# Patient Record
Sex: FEMALE | Race: AMERICAN INDIAN OR ALASKA NATIVE
[De-identification: names, ages, dates, MRNs, and addresses within clinical notes are randomized per-mention and may not be internally consistent; named-entity substitution may affect disease eponyms.]

---

## 2022-05-09 ENCOUNTER — HOSPITAL ENCOUNTER (EMERGENCY)
Dept: HOSPITAL 5 - ED | Age: 37
Discharge: HOME | End: 2022-05-09
Payer: OTHER GOVERNMENT

## 2022-05-09 VITALS — SYSTOLIC BLOOD PRESSURE: 133 MMHG | DIASTOLIC BLOOD PRESSURE: 86 MMHG

## 2022-05-09 DIAGNOSIS — M79.671: Primary | ICD-10-CM

## 2022-05-09 PROCEDURE — 99283 EMERGENCY DEPT VISIT LOW MDM: CPT

## 2022-05-09 NOTE — XRAY REPORT
RIGHT FOOT 3 VIEWS



INDICATION / CLINICAL INFORMATION:

injury, pain



COMPARISON:

None available.

 

FINDINGS:



BONES / JOINT(S): No acute fracture or subluxation. No significant arthritis.

SOFT TISSUES: No significant abnormality.



ADDITIONAL FINDINGS: None.





IMPRESSION:

No acute findings.



Signer Name: Juliocesar Silverman MD 

Signed: 5/9/2022 1:04 PM

Workstation Name: [id number]

## 2022-05-09 NOTE — EMERGENCY DEPARTMENT REPORT
ED Lower Extremity HPI





- General


Chief Complaint: Extremity Injury, Lower


Stated Complaint: RT FOOT INJURY


Time Seen by Provider: 05/09/22 12:39


Source: patient


Mode of arrival: Ambulatory


Limitations: No Limitations





- History of Present Illness


Initial Comments: 





36-year-old black female with a past medical history of multiple sclerosis 

presents to the emergency department for evaluation of right foot pain.  She 

states that while shopping in the mall on Saturday, a table fell down and hit 

her on the top of her right foot and she has had increasing pain and swelling 

since then.  She states that she has had a very difficult time attempting to 

ambulate on limb.


MD Complaint: foot injury


-: Sudden, days(s) (To)


Injury: Foot: Right


Type of Injury: blunt


Place: other (While in the mild)


Severity: severe


Severity scale (0 -10): 6


Worsens With: palpation


Context: direct blow, other (Table fell on foot)


Associated Symptoms: swelling, able to partially bear weight.  denies: snap/pop 

sensation, numbness, tingling, unable to bear weight





- Related Data


                                  Previous Rx's











 Medication  Instructions  Recorded  Last Taken  Type


 


Naproxen [Naprosyn] 500 mg PO BID #14 tab 05/09/22 Unknown Rx











                                    Allergies











Allergy/AdvReac Type Severity Reaction Status Date / Time


 


No Known Allergies Allergy   Unverified 05/09/22 12:08














ED Review of Systems


ROS: 


Stated complaint: RT FOOT INJURY


Other details as noted in HPI





Comment: All other systems reviewed and negative


Constitutional: denies: chills, fever


Respiratory: denies: shortness of breath


Cardiovascular: denies: chest pain, palpitations


Gastrointestinal: denies: abdominal pain, nausea, vomiting


Musculoskeletal: denies: back pain


Skin: denies: rash


Neurological: denies: headache, weakness, numbness, paresthesias, confusion





ED Past Medical Hx





- Past Medical History


Previous Medical History?: Yes


Additional medical history: MS,Fibromyalgia





- Surgical History


Past Surgical History?: No





- Social History


Smoking Status: Never Smoker





- Medications


Home Medications: 


                                Home Medications











 Medication  Instructions  Recorded  Confirmed  Last Taken  Type


 


Naproxen [Naprosyn] 500 mg PO BID #14 tab 05/09/22  Unknown Rx














ED Physical Exam





- General


Limitations: No Limitations


General appearance: alert, in no apparent distress





- Head


Head exam: Present: atraumatic, normocephalic





- Eye


Eye exam: Present: normal appearance.  Absent: conjunctival injection





- Neck


Neck exam: Present: normal inspection





- Respiratory


Respiratory exam: Absent: respiratory distress





- Cardiovascular


Cardiovascular Exam: Present: regular rate





- GI/Abdominal


GI/Abdominal exam: Absent: distended





- Expanded Lower Extremity Exam


  ** Right


Foot/Toe exam: Present: full ROM, tenderness, swelling.  Absent: normal 

inspection, abrasion, laceration, ecchymosis, deformity, crepidus, dislocation, 

erythema, puncture wound, calcaneal tenderness, tenderness at base of 5th 

metatarsal, nail avulsion, subungual hematoma


Neuro vascular tendon exam: Present: no vascular compromise.  Absent: pulse 

deficit, abnormal cap refill, motor deficit, sensory deficit, extremity cold to 

touch, pallor


Gait: Positive: not tested/not observed





                            __________________________














                            __________________________





 1 - Tenderness and swelling noted.








- Back Exam


Back exam: Present: normal inspection





- Neurological Exam


Neurological exam: Present: alert, oriented X3





- Psychiatric


Psychiatric exam: Present: normal affect, normal mood





- Skin


Skin exam: Present: warm, dry, intact, normal color





ED Course


                                   Vital Signs











  05/09/22





  12:03


 


Temperature 98.4 F


 


Pulse Rate 60


 


Respiratory 18





Rate 


 


Blood Pressure 106/65


 


O2 Sat by Pulse 99





Oximetry 














ED Lower Extremity MDM





- Radiology Data


Radiology results: report reviewed, image reviewed





Right foot x-ray:


FINDINGS:  


 


 BONES / JOINT(S): No acute fracture or subluxation. No significant arthritis.  


 SOFT TISSUES: No significant abnormality.  


 


 ADDITIONAL FINDINGS: None.  


 


 


 IMPRESSION:  


 No acute findings.  





- Medical Decision Making





36-year-old black female with a past medical history of multiple sclerosis 

presents to the emergency department for evaluation of right foot pain.  She 

states that while shopping in the mall on Saturday, a table fell down and hit 

her on the top of her right foot and she has had increasing pain and swelling 

since then.  She states that she has had a very difficult time attempting to am

bulate on limb.





No gross abnormalities noted on exam, and right foot x-ray without any acute 

osseous abnormalities.  Patient will be treated for musculoskeletal pain and 

placed in Ace wrap and given postop shoe to improve ambulation.  She will be 

discharged home with 7-day course of naproxen and she is advised to take 

medication as prescribed, use RICE method, and follow-up with orthopedics if no 

improvement or worsening symptoms.  She verbalized understanding of and 

agreement with plan of care.


Critical care attestation.: 


If time is entered above; I have spent that time in minutes in the direct care 

of this critically ill patient, excluding procedure time.








ED Disposition


Clinical Impression: 


 Right foot pain





Disposition: 01 HOME / SELF CARE / HOMELESS


Is pt being admited?: No


Does the pt Need Aspirin: No


Condition: Stable


Instructions:  How to Use Cold Therapy, Easy-to-Read, Musculoskeletal Pain


Additional Instructions: 


Take medications as prescribed.  Follow-up with primary care provider if no 

improvement or worsening symptoms.  Return to the emergency department as 

needed.


Prescriptions: 


Naproxen [Naprosyn] 500 mg PO BID #14 tab


Referrals: 


FIONA MCKINLEY MD [Staff Physician] - 3-5 Days


ARI FONTENOT MD [Staff Physician] - 3-5 Days


Forms:  Work/School Release Form(ED)


Time of Disposition: 13:45